# Patient Record
Sex: FEMALE | Race: WHITE | Employment: UNEMPLOYED | URBAN - NONMETROPOLITAN AREA
[De-identification: names, ages, dates, MRNs, and addresses within clinical notes are randomized per-mention and may not be internally consistent; named-entity substitution may affect disease eponyms.]

---

## 2022-06-13 ENCOUNTER — HOSPITAL ENCOUNTER (INPATIENT)
Age: 22
LOS: 2 days | Discharge: HOME | DRG: 885 | End: 2022-06-16
Attending: EMERGENCY MEDICINE | Admitting: PSYCHIATRY & NEUROLOGY
Payer: MEDICAID

## 2022-06-13 DIAGNOSIS — R45.851 DEPRESSION WITH SUICIDAL IDEATION: Primary | ICD-10-CM

## 2022-06-13 DIAGNOSIS — F32.A DEPRESSION WITH SUICIDAL IDEATION: Primary | ICD-10-CM

## 2022-06-13 LAB
ALBUMIN SERPL-MCNC: 4.4 G/DL (ref 3.5–5.2)
ALP BLD-CCNC: 61 U/L (ref 35–104)
ALT SERPL-CCNC: <5 U/L (ref 5–33)
AMPHETAMINE SCREEN, URINE: NEGATIVE
ANION GAP SERPL CALCULATED.3IONS-SCNC: 10 MMOL/L (ref 7–19)
AST SERPL-CCNC: 13 U/L (ref 5–32)
BARBITURATE SCREEN URINE: NEGATIVE
BASOPHILS ABSOLUTE: 0.1 K/UL (ref 0–0.2)
BASOPHILS RELATIVE PERCENT: 1 % (ref 0–1)
BENZODIAZEPINE SCREEN, URINE: NEGATIVE
BILIRUB SERPL-MCNC: 0.3 MG/DL (ref 0.2–1.2)
BUN BLDV-MCNC: 13 MG/DL (ref 6–20)
CALCIUM SERPL-MCNC: 9 MG/DL (ref 8.6–10)
CANNABINOID SCREEN URINE: POSITIVE
CHLORIDE BLD-SCNC: 103 MMOL/L (ref 98–111)
CO2: 26 MMOL/L (ref 22–29)
COCAINE METABOLITE SCREEN URINE: NEGATIVE
CREAT SERPL-MCNC: 0.8 MG/DL (ref 0.5–0.9)
EOSINOPHILS ABSOLUTE: 0 K/UL (ref 0–0.6)
EOSINOPHILS RELATIVE PERCENT: 0 % (ref 0–5)
ETHANOL: <10 MG/DL (ref 0–0.08)
GFR AFRICAN AMERICAN: >59
GFR NON-AFRICAN AMERICAN: >60
GLUCOSE BLD-MCNC: 96 MG/DL (ref 74–109)
HCG QUALITATIVE: NEGATIVE
HCT VFR BLD CALC: 38.6 % (ref 37–47)
HEMOGLOBIN: 12.9 G/DL (ref 12–16)
IMMATURE GRANULOCYTES #: 0 K/UL
LYMPHOCYTES ABSOLUTE: 6.3 K/UL (ref 1.1–4.5)
LYMPHOCYTES RELATIVE PERCENT: 57 % (ref 20–40)
Lab: ABNORMAL
MCH RBC QN AUTO: 28.7 PG (ref 27–31)
MCHC RBC AUTO-ENTMCNC: 33.4 G/DL (ref 33–37)
MCV RBC AUTO: 86 FL (ref 81–99)
MONOCYTES ABSOLUTE: 0.8 K/UL (ref 0–0.9)
MONOCYTES RELATIVE PERCENT: 7 % (ref 0–10)
NEUTROPHILS ABSOLUTE: 3.9 K/UL (ref 1.5–7.5)
NEUTROPHILS RELATIVE PERCENT: 35 % (ref 50–65)
OPIATE SCREEN URINE: NEGATIVE
PDW BLD-RTO: 13.2 % (ref 11.5–14.5)
PLATELET # BLD: 246 K/UL (ref 130–400)
PLATELET SLIDE REVIEW: ADEQUATE
PMV BLD AUTO: 11.3 FL (ref 9.4–12.3)
POTASSIUM SERPL-SCNC: 3.9 MMOL/L (ref 3.5–5)
RBC # BLD: 4.49 M/UL (ref 4.2–5.4)
RBC # BLD: NORMAL 10*6/UL
SARS-COV-2, NAAT: NOT DETECTED
SODIUM BLD-SCNC: 139 MMOL/L (ref 136–145)
TOTAL PROTEIN: 7.2 G/DL (ref 6.6–8.7)
WBC # BLD: 11.1 K/UL (ref 4.8–10.8)

## 2022-06-13 PROCEDURE — 82077 ASSAY SPEC XCP UR&BREATH IA: CPT

## 2022-06-13 PROCEDURE — 85025 COMPLETE CBC W/AUTO DIFF WBC: CPT

## 2022-06-13 PROCEDURE — 36415 COLL VENOUS BLD VENIPUNCTURE: CPT

## 2022-06-13 PROCEDURE — 80307 DRUG TEST PRSMV CHEM ANLYZR: CPT

## 2022-06-13 PROCEDURE — 80053 COMPREHEN METABOLIC PANEL: CPT

## 2022-06-13 PROCEDURE — 84703 CHORIONIC GONADOTROPIN ASSAY: CPT

## 2022-06-13 PROCEDURE — 87635 SARS-COV-2 COVID-19 AMP PRB: CPT

## 2022-06-13 ASSESSMENT — ENCOUNTER SYMPTOMS
EYE PAIN: 0
SHORTNESS OF BREATH: 0
DIARRHEA: 0
ABDOMINAL PAIN: 0
VOMITING: 0

## 2022-06-14 PROBLEM — F41.9 ANXIETY DISORDER, UNSPECIFIED: Status: ACTIVE | Noted: 2022-06-14

## 2022-06-14 PROBLEM — F41.8 MIXED ANXIETY AND DEPRESSIVE DISORDER: Status: ACTIVE | Noted: 2022-06-14

## 2022-06-14 PROBLEM — F33.1 MODERATE EPISODE OF RECURRENT MAJOR DEPRESSIVE DISORDER (HCC): Status: ACTIVE | Noted: 2022-06-14

## 2022-06-14 PROBLEM — F33.1 MODERATE EPISODE OF RECURRENT MAJOR DEPRESSIVE DISORDER (HCC): Status: RESOLVED | Noted: 2022-06-14 | Resolved: 2022-06-14

## 2022-06-14 PROCEDURE — 90792 PSYCH DIAG EVAL W/MED SRVCS: CPT | Performed by: NURSE PRACTITIONER

## 2022-06-14 PROCEDURE — 99285 EMERGENCY DEPT VISIT HI MDM: CPT

## 2022-06-14 PROCEDURE — 1240000000 HC EMOTIONAL WELLNESS R&B

## 2022-06-14 PROCEDURE — 6370000000 HC RX 637 (ALT 250 FOR IP): Performed by: PSYCHIATRY & NEUROLOGY

## 2022-06-14 PROCEDURE — 6370000000 HC RX 637 (ALT 250 FOR IP): Performed by: NURSE PRACTITIONER

## 2022-06-14 RX ORDER — TRAZODONE HYDROCHLORIDE 50 MG/1
25 TABLET ORAL NIGHTLY PRN
Status: DISCONTINUED | OUTPATIENT
Start: 2022-06-14 | End: 2022-06-16 | Stop reason: HOSPADM

## 2022-06-14 RX ORDER — ACETAMINOPHEN 325 MG/1
650 TABLET ORAL EVERY 4 HOURS PRN
Status: DISCONTINUED | OUTPATIENT
Start: 2022-06-14 | End: 2022-06-16 | Stop reason: HOSPADM

## 2022-06-14 RX ORDER — MECOBALAMIN 5000 MCG
5 TABLET,DISINTEGRATING ORAL NIGHTLY
Status: DISCONTINUED | OUTPATIENT
Start: 2022-06-14 | End: 2022-06-16 | Stop reason: HOSPADM

## 2022-06-14 RX ORDER — TRAZODONE HYDROCHLORIDE 50 MG/1
25 TABLET ORAL NIGHTLY
Status: DISCONTINUED | OUTPATIENT
Start: 2022-06-14 | End: 2022-06-14

## 2022-06-14 RX ORDER — POLYETHYLENE GLYCOL 3350 17 G/17G
17 POWDER, FOR SOLUTION ORAL DAILY PRN
Status: DISCONTINUED | OUTPATIENT
Start: 2022-06-14 | End: 2022-06-16 | Stop reason: HOSPADM

## 2022-06-14 RX ORDER — FLUOXETINE HYDROCHLORIDE 20 MG/1
20 CAPSULE ORAL DAILY
Status: DISCONTINUED | OUTPATIENT
Start: 2022-06-14 | End: 2022-06-16 | Stop reason: HOSPADM

## 2022-06-14 RX ORDER — HYDROXYZINE HYDROCHLORIDE 10 MG/1
10 TABLET, FILM COATED ORAL 3 TIMES DAILY PRN
Status: DISCONTINUED | OUTPATIENT
Start: 2022-06-14 | End: 2022-06-15

## 2022-06-14 RX ORDER — DOXEPIN HYDROCHLORIDE 25 MG/1
25 CAPSULE ORAL NIGHTLY
Status: DISCONTINUED | OUTPATIENT
Start: 2022-06-14 | End: 2022-06-14

## 2022-06-14 RX ADMIN — HYDROXYZINE HYDROCHLORIDE 10 MG: 10 TABLET ORAL at 22:10

## 2022-06-14 RX ADMIN — TRAZODONE HYDROCHLORIDE 12.5 MG: 50 TABLET ORAL at 22:07

## 2022-06-14 RX ADMIN — FLUOXETINE HYDROCHLORIDE 20 MG: 20 CAPSULE ORAL at 12:49

## 2022-06-14 RX ADMIN — Medication 5 MG: at 22:10

## 2022-06-14 RX ADMIN — Medication 5 MG: at 01:39

## 2022-06-14 RX ADMIN — HYDROXYZINE HYDROCHLORIDE 10 MG: 10 TABLET ORAL at 12:49

## 2022-06-14 RX ADMIN — DOXEPIN HYDROCHLORIDE 25 MG: 25 CAPSULE ORAL at 01:39

## 2022-06-14 SDOH — ECONOMIC STABILITY: HOUSING INSECURITY: IN THE LAST 12 MONTHS, HOW MANY PLACES HAVE YOU LIVED?: 1

## 2022-06-14 SDOH — ECONOMIC STABILITY: TRANSPORTATION INSECURITY
IN THE PAST 12 MONTHS, HAS THE LACK OF TRANSPORTATION KEPT YOU FROM MEDICAL APPOINTMENTS OR FROM GETTING MEDICATIONS?: NO

## 2022-06-14 SDOH — HEALTH STABILITY: PHYSICAL HEALTH: ON AVERAGE, HOW MANY DAYS PER WEEK DO YOU ENGAGE IN MODERATE TO STRENUOUS EXERCISE (LIKE A BRISK WALK)?: 0 DAYS

## 2022-06-14 SDOH — ECONOMIC STABILITY: FOOD INSECURITY: WITHIN THE PAST 12 MONTHS, THE FOOD YOU BOUGHT JUST DIDN'T LAST AND YOU DIDN'T HAVE MONEY TO GET MORE.: NEVER TRUE

## 2022-06-14 SDOH — ECONOMIC STABILITY: INCOME INSECURITY: IN THE LAST 12 MONTHS, WAS THERE A TIME WHEN YOU WERE NOT ABLE TO PAY THE MORTGAGE OR RENT ON TIME?: YES

## 2022-06-14 SDOH — ECONOMIC STABILITY: TRANSPORTATION INSECURITY
IN THE PAST 12 MONTHS, HAS LACK OF TRANSPORTATION KEPT YOU FROM MEETINGS, WORK, OR FROM GETTING THINGS NEEDED FOR DAILY LIVING?: NO

## 2022-06-14 SDOH — ECONOMIC STABILITY: HOUSING INSECURITY
IN THE LAST 12 MONTHS, WAS THERE A TIME WHEN YOU DID NOT HAVE A STEADY PLACE TO SLEEP OR SLEPT IN A SHELTER (INCLUDING NOW)?: NO

## 2022-06-14 SDOH — HEALTH STABILITY: PHYSICAL HEALTH: ON AVERAGE, HOW MANY MINUTES DO YOU ENGAGE IN EXERCISE AT THIS LEVEL?: 0 MIN

## 2022-06-14 SDOH — ECONOMIC STABILITY: FOOD INSECURITY: WITHIN THE PAST 12 MONTHS, YOU WORRIED THAT YOUR FOOD WOULD RUN OUT BEFORE YOU GOT MONEY TO BUY MORE.: NEVER TRUE

## 2022-06-14 ASSESSMENT — SOCIAL DETERMINANTS OF HEALTH (SDOH)
DO YOU BELONG TO ANY CLUBS OR ORGANIZATIONS SUCH AS CHURCH GROUPS UNIONS, FRATERNAL OR ATHLETIC GROUPS, OR SCHOOL GROUPS?: NO
WITHIN THE LAST YEAR, HAVE YOU BEEN HUMILIATED OR EMOTIONALLY ABUSED IN OTHER WAYS BY YOUR PARTNER OR EX-PARTNER?: NO
WITHIN THE LAST YEAR, HAVE TO BEEN RAPED OR FORCED TO HAVE ANY KIND OF SEXUAL ACTIVITY BY YOUR PARTNER OR EX-PARTNER?: NO
HOW OFTEN DO YOU ATTENT MEETINGS OF THE CLUB OR ORGANIZATION YOU BELONG TO?: NEVER
HOW OFTEN DO YOU GET TOGETHER WITH FRIENDS OR RELATIVES?: MORE THAN THREE TIMES A WEEK
WITHIN THE LAST YEAR, HAVE YOU BEEN AFRAID OF YOUR PARTNER OR EX-PARTNER?: NO
HOW OFTEN DO YOU ATTEND CHURCH OR RELIGIOUS SERVICES?: NEVER
ARE YOU MARRIED, WIDOWED, DIVORCED, SEPARATED, NEVER MARRIED, OR LIVING WITH A PARTNER?: NEVER MARRIED
HOW HARD IS IT FOR YOU TO PAY FOR THE VERY BASICS LIKE FOOD, HOUSING, MEDICAL CARE, AND HEATING?: NOT HARD AT ALL
IN A TYPICAL WEEK, HOW MANY TIMES DO YOU TALK ON THE PHONE WITH FAMILY, FRIENDS, OR NEIGHBORS?: MORE THAN THREE TIMES A WEEK
WITHIN THE LAST YEAR, HAVE YOU BEEN KICKED, HIT, SLAPPED, OR OTHERWISE PHYSICALLY HURT BY YOUR PARTNER OR EX-PARTNER?: NO

## 2022-06-14 ASSESSMENT — LIFESTYLE VARIABLES
HOW OFTEN DO YOU HAVE A DRINK CONTAINING ALCOHOL: NEVER
HOW OFTEN DO YOU HAVE A DRINK CONTAINING ALCOHOL: NEVER

## 2022-06-14 ASSESSMENT — PATIENT HEALTH QUESTIONNAIRE - PHQ9
DEPRESSION UNABLE TO ASSESS: YES
6. FEELING BAD ABOUT YOURSELF - OR THAT YOU ARE A FAILURE OR HAVE LET YOURSELF OR YOUR FAMILY DOWN: NEARLY EVERY DAY
2. FEELING DOWN, DEPRESSED OR HOPELESS: NEARLY EVERY DAY
1. LITTLE INTEREST OR PLEASURE IN DOING THINGS: NEARLY EVERY DAY
3. TROUBLE FALLING OR STAYING ASLEEP: NEARLY EVERY DAY
9. THOUGHTS THAT YOU WOULD BE BETTER OFF DEAD, OR OF HURTING YOURSELF: MORE THAN HALF THE DAYS
8. MOVING OR SPEAKING SO SLOWLY THAT OTHER PEOPLE COULD HAVE NOTICED. OR THE OPPOSITE, BEING SO FIGETY OR RESTLESS THAT YOU HAVE BEEN MOVING AROUND A LOT MORE THAN USUAL: NOT AT ALL
5. POOR APPETITE OR OVEREATING: MORE THAN HALF THE DAYS
SUM OF ALL RESPONSES TO PHQ9 QUESTIONS 1 & 2: 6
7. TROUBLE CONCENTRATING ON THINGS, SUCH AS READING THE NEWSPAPER OR WATCHING TELEVISION: NEARLY EVERY DAY
4. FEELING TIRED OR HAVING LITTLE ENERGY: NEARLY EVERY DAY
SUM OF ALL RESPONSES TO PHQ QUESTIONS 1-9: 22

## 2022-06-14 ASSESSMENT — SLEEP AND FATIGUE QUESTIONNAIRES
AVERAGE NUMBER OF SLEEP HOURS: 6
DO YOU HAVE DIFFICULTY SLEEPING: YES
SLEEP PATTERN: DIFFICULTY FALLING ASLEEP;DIFFICULTY ARISING;RESTLESSNESS
DO YOU USE A SLEEP AID: NO
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
## 2022-06-14 NOTE — PROGRESS NOTES
Admission Note      Reason for admission/Target Symptom: Patient admitted to [facility] due to:female who presents to the emergency department due to depression and suicidal ideation. Patient says she is out with depression for some time. Recently started following with [facility] [address] and is supposed to be on Celexa but has not been compliant with medication for the past month or so. No HI. No hallucinations or delusions. Does have SI without a specific plan. Denies any other medical problems. After being seen by her therapist today she was sent here as her therapist felt like she probably needs to be admitted. Patient is agreeable to admission. Diagnoses: Depression NOS  UDS: Cannabinoid   BAL:  Neg    SW met with treatment team to discuss patient's treatment including care planning, discharge planning, and follow-up needs. Pt has been admitted to [facility]. Treatment team has identified patient's discharge needs as medication management and outpatient therapy/counseling. Pt confirmed  the need for ongoing treatment post inpatient stay. Pt was also provided a handout of contact information for drug and alcohol treatment centers and other community support service such as [facility], AA, and [facility].

## 2022-06-14 NOTE — PLAN OF CARE
Problem: Self Harm/Suicidality  Goal: Will have no self-injury during hospital stay  6/14/2022 1628 by Juan Gonzalez  Outcome: Progressing       Group Therapy Note     Date: 6/14/2022  Start Time: 5891  End Time:  1600  Number of Participants: 9     Type of Group: Recovery     Wellness Binder Information  Module Name:  emotional wellness  Session Number:  1     Patient's Goal:  validation of feelings     Notes:  pt acknowledged to have feelings validated it may be necessary to share feelings with others.      Status After Intervention:  Improved     Participation Level: Interactive     Participation Quality: Appropriate, Attentive, and Sharing        Speech:  normal        Thought Process/Content: Logical        Affective Functioning: Congruent        Mood: congruent        Level of consciousness:  Alert, Oriented x4, and Attentive        Response to Learning: Able to verbalize current knowledge/experience        Endings: None Reported     Modes of Intervention: Education        Discipline Responsible: Psychoeducational Specialist        Signature:  Juan Gonzalez

## 2022-06-14 NOTE — H&P
Behavioral Services  Medicare Certification Upon Admission    I certify that this patient's inpatient psychiatric hospital admission is medically necessary for:    [x] (1) Treatment which could reasonably be expected to improve this patient's condition,       [] (2) Or for diagnostic study;     AND     [x](2) The inpatient psychiatric services are provided while the individual is under the care of a physician and are included in the individualized plan of care.     Estimated length of stay/service: 3 days-5 weeks    Plan for post-hospital care : tba    Electronically signed by KANDY Dodge on 6/14/2022 at 9:52 AM

## 2022-06-14 NOTE — PROGRESS NOTES
[facility] Adult Unit Daily Assessment  Nursing Progress Note    [place] [facility] [place] 2[place]606-01   Name: Winnie Forrest   Age: 25 y.o. Gender: female   Dx: Moderate episode of recurrent major depressive disorder (HCC)  Precautions: suicide risk  Inpatient Status: voluntary       SLEEP:    Sleep Quality Fair  Sleep Medications:   PRN Sleep Meds:        MEDICAL:    Other PRN Meds: No   Med Compliant: Yes  Accu-Chek: No  Oxygen/CPAP/BiPAP: No  CIWA/CINA: No   PAIN Assessment: none  Side Effects from medication: No    COVID Teaching:    Is Patient experiencing any respiratory symptoms (headache, fever, body aches, cough. Cherl Spinner ): no  Patient educated by nursing to practice social distancing, wear masks, wash hands frequently: yes    Medical Bed:   Is patient in a medical bed? no   If medical bed is in use, has nursing secured room while patient is awake and out of the room? NA  Has safety checks by nursing been completed on the bed/room this shift? yes    Protective Factors:    Patient identifies protective factors with nursing staff as follows: Identifies reasons for living: Yes   Supportive Social Network or family: Yes    Belief that suicide is immoral/high spirituality: Yes   Responsibility to family or others/living with family: Yes   Fear of death or dying due to pain and suffering: Yes   Engaged in work or school: Yes     If Patient is unable to identify, reason why? na      PSYCH:    Depression: 7   Anxiety: 10   SI denies suicidal ideation   HI Negative for homicidal ideation      AVH:Absent      GENERAL:    Appetite: good   Percent Meals: 100%   Social: Yes   Speech: normal   Appearance: appropriately dressed, appropriately groomed and healthy looking    GROUP:    Group Participation: Yes  Participation Quality: Active Listener    Notes: Patient is observed in day area talking on phone wearing casual attire. Well groomed. Cherl Spinner Has been pleasant, calm, and cooperative. Alert and oriented x 4. Social and friendly with staff and peers. Compliant with medications. Attending groups. Reports no issues with appetite and fair sleep. Decreased energy observed. Reports decreased motivation with loss of interest. Has been quiet and withdrawn this am. Mood and behavior is appropriate. Affect is congruent. Denies suicidal ideation, homicidal ideation, hallucinations, with no evidence of delusions or paranoia.      Electronically signed by Brittney Jimenez RN on 6/14/22 at 12:22 PM CDT

## 2022-06-14 NOTE — ED PROVIDER NOTES
[facility] ADULT [facility]  eMERGENCY dEPARTMENT eNCOUnter      Pt Name: Kingsley Vasquez  MRN: [id number]  [place] 2000  Date of evaluation: 6/13/2022  Provider: Lena Love MD    CHIEF COMPLAINT       Chief Complaint   Patient presents with    Mental Health Problem         HISTORY OF PRESENT ILLNESS   (Location/Symptom, Timing/Onset,Context/Setting, Quality, Duration, Modifying Factors, Severity)  Note limiting factors. Kingsley Vasquez is a 25 y.o. female who presents to the emergency department due to depression and suicidal ideation. Patient says she is out with depression for some time. Recently started following with [facility] [address] and is supposed to be on Celexa but has not been compliant with medication for the past month or so. No HI. No hallucinations or delusions. Does have SI without a specific plan. Denies any other medical problems. After being seen by her therapist today she was sent here as her therapist felt like she probably needs to be admitted. Patient is agreeable to admission. HPI    NursingNotes were reviewed. REVIEW OF SYSTEMS    (2-9 systems for level 4, 10 or more for level 5)     Review of Systems   Constitutional: Negative for fever. Eyes: Negative for pain. Respiratory: Negative for shortness of breath. Cardiovascular: Negative for chest pain and palpitations. Gastrointestinal: Negative for abdominal pain, diarrhea and vomiting. Genitourinary: Negative for dysuria. Skin: Negative for rash. Neurological: Negative for weakness and headaches. Psychiatric/Behavioral: Positive for self-injury (has cut in the past. hasn't cut recently) and suicidal ideas. Negative for hallucinations. All other systems reviewed and are negative. A complete review of systems was performed and is negative except as noted above in the HPI. PAST MEDICAL HISTORY   History reviewed. No pertinent past medical history. SURGICAL HISTORY     History reviewed.  No pertinent surgical history. CURRENT MEDICATIONS       There are no discharge medications for this patient. ALLERGIES     Patient has no known allergies. FAMILY HISTORY     History reviewed. No pertinent family history. SOCIAL HISTORY       Social History     Socioeconomic History    Marital status: Single     Spouse name: None    Number of children: None    Years of education: None    Highest education level: None   Occupational History    None   Tobacco Use    Smoking status: Never Smoker    Smokeless tobacco: Never Used   Substance and Sexual Activity    Alcohol use: Not Currently    Drug use: Yes     Types: Marijuana [place])    Sexual activity: None   Other Topics Concern    None   Social History Narrative    None     Social Determinants of Health     Financial Resource Strain: Low Risk     Difficulty of Paying Living Expenses: Not hard at all   Food Insecurity: No Food Insecurity    Worried About Running Out of Food in the Last Year: Never true    Shauna of Food in the Last Year: Never true   Transportation Needs: No Transportation Needs    Lack of Transportation (Medical): No    Lack of Transportation (Non-Medical):  No   Physical Activity: Inactive    Days of Exercise per Week: 0 days    Minutes of Exercise per Session: 0 min   Stress: Stress Concern Present    Feeling of Stress : Very much   Social Connections: Socially Isolated    Frequency of Communication with Friends and Family: More than three times a week    Frequency of Social Gatherings with Friends and Family: More than three times a week    Attends Adventism Services: Never    Active Member of Clubs or Organizations: No    Attends Club or Organization Meetings: Never    Marital Status: Never    Intimate Partner Violence: Not At Risk    Fear of Current or Ex-Partner: No    Emotionally Abused: No    Physically Abused: No    Sexually Abused: No   Housing Stability: High Risk    Unable to Pay for Housing in the Last Year: Yes    Number of Places Lived in the Last Year: 1    Unstable Housing in the Last Year: No       SCREENINGS    Pavithra Coma Scale  Eye Opening: Spontaneous  Best Verbal Response: Oriented  Best Motor Response: Obeys commands  Pavithra Coma Scale Score: 15        PHYSICAL EXAM    (up to 7 for level 4, 8 or more for level 5)     ED Triage Vitals   BP Temp Temp Source Heart Rate Resp SpO2 Height Weight   06/13/22 2210 06/13/22 2145 06/13/22 2145 06/13/22 2210 06/13/22 2145 06/13/22 2210 06/13/22 2145 06/13/22 2145   107/63 97.5 °F (36.4 °C) Oral 95 15 96 % 5' 4\" (1.626 m) 128 lb (58.1 kg)       Physical Exam  Vitals reviewed. Constitutional:       General: She is not in acute distress. Appearance: She is well-developed. HENT:      Head: Normocephalic and atraumatic. Eyes:      General: No scleral icterus. Pupils: Pupils are equal, round, and reactive to light. Neck:      Vascular: No JVD. Cardiovascular:      Rate and Rhythm: Normal rate and regular rhythm. Heart sounds: Normal heart sounds. Pulmonary:      Effort: Pulmonary effort is normal. No respiratory distress. Breath sounds: Normal breath sounds. Abdominal:      General: There is no distension. Palpations: Abdomen is soft. Tenderness: There is no abdominal tenderness. Musculoskeletal:         General: No tenderness. Cervical back: Normal range of motion and neck supple. Skin:     General: Skin is warm and dry. Capillary Refill: Capillary refill takes less than 2 seconds. Neurological:      Mental Status: She is alert and oriented to person, place, and time. Psychiatric:         Mood and Affect: Mood is depressed. Speech: Speech normal.         Behavior: Behavior normal.         Thought Content: Thought content does not include suicidal ideation.          DIAGNOSTIC RESULTS     EKG: All EKG's are interpreted by the Emergency Department Physician who either signs or Co-signs this chart in the absence of a cardiologist.        RADIOLOGY:   Non-plain film images such as CT, Ultrasound and MRI are read by the radiologist. Plainradiographic images are visualized and preliminarily interpreted by the emergency physician with the below findings:        Interpretation per the Radiologist below, if available at the time of this note:    No orders to display         ED BEDSIDE ULTRASOUND:   Performed by ED Physician - none    LABS:  Labs Reviewed   CBC WITH AUTO DIFFERENTIAL - Abnormal; Notable for the following components:       Result Value    WBC 11.1 (*)     Neutrophils % 35.0 (*)     Lymphocytes % 57.0 (*)     Lymphocytes Absolute 6.3 (*)     All other components within normal limits   COMPREHENSIVE METABOLIC PANEL - Abnormal; Notable for the following components:    ALT <5 (*)     All other components within normal limits   URINE DRUG SCREEN - Abnormal; Notable for the following components:    Cannabinoid Scrn, Ur Positive (*)     All other components within normal limits   COVID-19, RAPID   ETHANOL   HCG, SERUM, QUALITATIVE       All other labs were within normal range or not returned as of this dictation. EMERGENCY DEPARTMENT COURSE and DIFFERENTIALDIAGNOSIS/MDM:   Vitals:    Vitals:    06/13/22 2145 06/13/22 2210 06/14/22 0013   BP:  107/63 110/71   Pulse:  85 72   Resp: 15 18 16   Temp: 97.5 °F (36.4 °C) 98 °F (36.7 °C) 98.2 °F (36.8 °C)   TempSrc: Oral Oral Temporal   SpO2:  96% 99%   Weight: 128 lb (58.1 kg)  127 lb (57.6 kg)   Height: 5' 4\" (1.626 m)         MDM  Patient's case discussed with Dr. Duane Robson who will admit. Patient resting comfortably at this time. CONSULTS:  IP CONSULT TO SOCIAL WORK  IP CONSULT TO INTERNAL MEDICINE    PROCEDURES:  Unless otherwise notedbelow, none     Procedures    FINAL IMPRESSION     1.  Depression with suicidal ideation          DISPOSITION/PLAN   DISPOSITION Decision To Admit 06/13/2022 11:53:18 PM      PATIENT REFERRED TO:  @FUP@    DISCHARGE MEDICATIONS:  There are no discharge medications for this patient.          (Please note that portions of this note were completed with a voice recognition program.  Efforts were made to edit the dictations butoccasionally words are mis-transcribed.)    Eugene Solis MD (electronically signed)  AttendingEmergency Physician         Eugene Solis MD  06/14/22 5269

## 2022-06-14 NOTE — PROGRESS NOTES
Treatment Team Note:    Therapist met with treatment team to discuss patients treatment and discharge plans. Progress/Behavior/Group Attendance: TBD    Target Symptoms/Reason for admission: Patient admitted to [facility] due to:female who presents to the emergency department due to depression and suicidal ideation. Candace Patten says she is out with depression for some time.  Recently started following with [facility] [address] and is supposed to be on Celexa but has not been compliant with medication for the past month or so.  No HI.  No hallucinations or delusions.  Does have SI without a specific plan.  Denies any other medical problems.  After being seen by her therapist today she was sent here as her therapist felt like she probably needs to be admitted. Candace Patten is agreeable to admission. Diagnoses: Depression NOS  UDS: Cannabinoid   BAL:  Neg      Aftercare Plan: [address] lives with: SYLVIE will meet with patient to gather information. Collateral obtained from: SW will meet with patient to gather release of information.   On:    Family Session: TBA    Depression:   Anxiety:    Taking medication:  Rate the quality of sleep:    Misc:

## 2022-06-14 NOTE — PROGRESS NOTES
Explained to patient she wouldn't be going home today and talked to patient 1 to 1 in room. Patient verbalizes understanding and apologetic.

## 2022-06-14 NOTE — ED NOTES
PT c/o depression and anxiety, denies SI/HI. PT states she sees a counselor at [facility] who suggested she come here for evaluation.       Tanja Bowers RN  06/13/22 4029
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
## 2022-06-14 NOTE — PLAN OF CARE
Problem: Self Harm/Suicidality  Goal: Will have no self-injury during hospital stay  Outcome: Progressing     Problem: Safety - Adult  Goal: Free from fall injury  Outcome: Progressing

## 2022-06-14 NOTE — PROGRESS NOTES
SW met with patient to complete Psychosocial and Lifetime CSSR-S on this date. Patients long and short-term goals discussed. Patient voiced understanding. Treatment plan sheet signed. Patient verbalized understanding of the treatment plan. Patient participated in goals and objectives of the treatment plan. Patient discussed safety plan with . SW explained treatment goals with pt. Decreasing depression and anxiety by improvement of positive coping patterns was discussed. Pt acknowledged understanding of treatment goals and signed treatment plan signature sheet. In the last 6 months has the patient been a danger to self: YES  In the last 6 months has the patient been a danger to others: NO  Legal Guardian/POA: NO     Provided patient with [employer] handout entitled \"Quitting Smoking. \"  Reviewed handout with patient: addressing dangers of smoking, developing coping skills, and providing basic information about quitting. Patient received all components practical counseling of tobacco practical counseling during the hospital stay.

## 2022-06-14 NOTE — PLAN OF CARE
Problem: Self Harm/Suicidality  Goal: Will have no self-injury during hospital stay  6/14/2022 1150 by Memo Bridges  Outcome: Progressing      Group Therapy Note     Date: 6/14/2022  Start Time: 1100  End Time:  3542  Number of Participants: 11     Type of Group: Psychoeducation     Wellness Binder Information  Module Name:  daily maintenance coping skills  Session Number:  1     Patient's Goal:  daily maintenance coping skills     Notes:  pt acknowledged use of positive coping skills daily to help stay well.      Status After Intervention:  Improved     Participation Level: Interactive     Participation Quality: Appropriate, Attentive, and Sharing        Speech:  normal        Thought Process/Content: Logical        Affective Functioning: Congruent        Mood: congruent           Level of consciousness:  Alert, Oriented x4, and Attentive        Response to Learning: Able to verbalize current knowledge/experience        Endings: None Reported     Modes of Intervention: Education        Discipline Responsible: Psychoeducational Specialist        Signature:  Memo Bridges

## 2022-06-15 LAB
TSH REFLEX FT4: 1.68 UIU/ML (ref 0.35–5.5)
VITAMIN B-12: 417 PG/ML (ref 211–946)
VITAMIN D 25-HYDROXY: 20.9 NG/ML

## 2022-06-15 PROCEDURE — 82607 VITAMIN B-12: CPT

## 2022-06-15 PROCEDURE — 82306 VITAMIN D 25 HYDROXY: CPT

## 2022-06-15 PROCEDURE — 6370000000 HC RX 637 (ALT 250 FOR IP): Performed by: NURSE PRACTITIONER

## 2022-06-15 PROCEDURE — 6370000000 HC RX 637 (ALT 250 FOR IP): Performed by: PSYCHIATRY & NEUROLOGY

## 2022-06-15 PROCEDURE — 1240000000 HC EMOTIONAL WELLNESS R&B

## 2022-06-15 PROCEDURE — 36415 COLL VENOUS BLD VENIPUNCTURE: CPT

## 2022-06-15 PROCEDURE — 84443 ASSAY THYROID STIM HORMONE: CPT

## 2022-06-15 PROCEDURE — 99232 SBSQ HOSP IP/OBS MODERATE 35: CPT | Performed by: NURSE PRACTITIONER

## 2022-06-15 RX ORDER — ERGOCALCIFEROL 1.25 MG/1
50000 CAPSULE ORAL WEEKLY
Status: DISCONTINUED | OUTPATIENT
Start: 2022-06-15 | End: 2022-06-16 | Stop reason: HOSPADM

## 2022-06-15 RX ORDER — HYDROXYZINE HYDROCHLORIDE 25 MG/1
25 TABLET, FILM COATED ORAL 3 TIMES DAILY PRN
Status: DISCONTINUED | OUTPATIENT
Start: 2022-06-15 | End: 2022-06-16 | Stop reason: HOSPADM

## 2022-06-15 RX ADMIN — Medication 5 MG: at 21:39

## 2022-06-15 RX ADMIN — FLUOXETINE HYDROCHLORIDE 20 MG: 20 CAPSULE ORAL at 08:09

## 2022-06-15 RX ADMIN — HYDROXYZINE HYDROCHLORIDE 25 MG: 25 TABLET, FILM COATED ORAL at 21:39

## 2022-06-15 RX ADMIN — HYDROXYZINE HYDROCHLORIDE 25 MG: 25 TABLET, FILM COATED ORAL at 13:32

## 2022-06-15 RX ADMIN — TRAZODONE HYDROCHLORIDE 12.5 MG: 50 TABLET ORAL at 21:39

## 2022-06-15 NOTE — PROGRESS NOTES
[facility] Adult Unit Daily Assessment  Nursing Progress Note     Room: [facility]/606-01   Name: Jayden Tomlinson   Age: 25 y.o. Gender: female   Dx: Moderate episode of recurrent major depressive disorder (HCC)  Precautions: suicide risk  Inpatient Status: voluntary         SLEEP:     Sleep Quality good  Sleep Medications:   PRN Sleep Meds:          MEDICAL:     Other PRN Meds: No   Med Compliant: Yes  Accu-Chek: No  Oxygen/CPAP/BiPAP: No  CIWA/CINA: No   PAIN Assessment: none  Side Effects from medication: No     COVID Teaching:     Is Patient experiencing any respiratory symptoms (headache, fever, body aches, cough. Xin Tan ): no  Patient educated by nursing to practice social distancing, wear masks, wash hands frequently: yes     Medical Bed:   Is patient in a medical bed? no   If medical bed is in use, has nursing secured room while patient is awake and out of the room? NA  Has safety checks by nursing been completed on the bed/room this shift? yes     Protective Factors:     Patient identifies protective factors with nursing staff as follows: Identifies reasons for living: Yes              Supportive Social Network or family: Yes               Belief that suicide is immoral/high spirituality: Yes              Responsibility to family or others/living with family: Yes              Fear of death or dying due to pain and suffering: Yes              Engaged in work or school: Yes                If Patient is unable to identify, reason why? na        PSYCH:     Depression:  0  Anxiety:  6  SI denies suicidal ideation   HI Negative for homicidal ideation      AVH:Absent        GENERAL:     Appetite: good   Percent Meals: 100%   Social: Yes   Speech: normal   Appearance: appropriately dressed, appropriately groomed and healthy looking     GROUP:     Group Participation: Yes  Participation Quality: Active Listener     Notes: Patient is observed in day area reading a book, wearing casual attire. Well groomed.  . Hellen Raman appropriately during interview with good eye contact and attentiveness. Has been pleasant, calm, and cooperative. Alert and oriented x 4. Social and friendly with staff and peers. Compliant with medications. Attending groups. Reports no issues with appetite or sleep. Affect is bright. Mood and behavior is appropriate. Reports no depression today and anxiety has improved from yesterday. Affect is congruent. Denies suicidal ideation, homicidal ideation, hallucinations, with no evidence of delusions or paranoia.      Electronically signed by Rosa Isela Naidu RN on 6/15/22 at 12:49 PM CDT

## 2022-06-15 NOTE — PLAN OF CARE
Problem: Self Harm/Suicidality  Goal: Will have no self-injury during hospital stay  6/15/2022 1612 by Ophelia Pardo  Outcome: Progressing      Group Therapy Note     Date: 6/15/2022  Start Time: 2086  End Time:  1600  Number of Participants: 9     Type of Group: Recovery     Wellness Binder Information  Module Name:  relapse prevention  Session Number:  2     Patient's Goal:  identifying early warning signs     Notes:  pt acknowledged negative thinking as an early warning sign to help prevent relapse.      Status After Intervention:  Improved     Participation Level: Interactive     Participation Quality: Appropriate, Attentive, and Sharing        Speech:  normal        Thought Process/Content: Logical        Affective Functioning: Congruent        Mood: congruent        Level of consciousness:  Alert, Oriented x4, and Attentive        Response to Learning: Able to verbalize current knowledge/experience        Endings: None Reported     Modes of Intervention: Education        Discipline Responsible: Psychoeducational Specialist        Signature:  Ophelia Pardo

## 2022-06-15 NOTE — PROGRESS NOTES
Group Therapy Note    Start Time: 800  End Time:  788  Number of Participants: 13    Type of Group: Community Meeting       Patient's Goal:  \"Controlling my anxiety & fears\"      Notes:        Participation Level:  Active Listener       Participation Quality: Appropriate      Thought Process/Content: Logical      Affective Functioning: Congruent      Mood: Calm      Level of consciousness:  Alert      Modes of Intervention: Support      Discipline Responsible: Behavioral Health Tech II      Signature:  Joshua Bhatia

## 2022-06-15 NOTE — PLAN OF CARE
Problem: Self Harm/Suicidality  Goal: Will have no self-injury during hospital stay  6/15/2022 1137 by Johnnie Ball  Outcome: Progressing      Group Therapy Note     Date: 6/15/2022  Start Time: 1100  End Time:  8713  Number of Participants: 11     Type of Group: Psychoeducation     Wellness Binder Information  Module Name:  Emotional wellness  Session Number:  5     Patient's Goal:  obstacles to emotional wellness     Notes:  pt acknowledged negative thinking as an obstacle to emotional wellness     Status After Intervention:  Improved     Participation Level: Interactive     Participation Quality: Appropriate, Attentive, and Sharing        Speech:  normal        Thought Process/Content: Logical        Affective Functioning: Congruent        Mood: congruent        Level of consciousness:  Alert, Oriented x4, and Attentive        Response to Learning: Able to verbalize current knowledge/experience        Endings: None Reported     Modes of Intervention: Education        Discipline Responsible: Psychoeducational Specialist        Signature:  Johnnie Ball

## 2022-06-15 NOTE — PROGRESS NOTES
History:   Social History     Substance and Sexual Activity   Alcohol Use Not Currently         Social History     Substance and Sexual Activity   Drug Use Yes    Types: Marijuana (Weed)        Social History     Tobacco Use   Smoking Status Never Smoker   Smokeless Tobacco Never Used        Family History:     History reviewed. No pertinent family history. Mental Status:  Level of consciousness:  within normal limits and awake  Appearance:  well-appearing, street clothes, in chair, good grooming and good hygiene  Behavior/Motor:  no abnormalities noted  Attitude toward examiner:  cooperative, attentive and good eye contact  Speech:  normal rate and normal volume  Mood:  \" I am actually feeling better today. \"  Affect:  mood congruent  Thought processes:  linear and goal directed  Thought content:  Homocidal ideation :denies  Suicidal Ideation:  denies suicidal ideation  Delusions:  no evidence of delusions  Perceptual Disturbance:  denies any perceptual disturbance  Cognition:  oriented to person, place, and time  Concentration : good- sitting in her bed reading a book  Memory intact for recent and remote  Fund of knowledge:  average  Abstract thinking:  adequate  Insight: improved-future oriented  Judgment:  Appropriate      vitamin D  50,000 Units Oral Weekly    melatonin  5 mg Oral Nightly    FLUoxetine  20 mg Oral Daily       Current Medications:  Current Facility-Administered Medications   Medication Dose Route Frequency Provider Last Rate Last Admin    vitamin D (ERGOCALCIFEROL) capsule 50,000 Units  50,000 Units Oral Weekly Nathaniel Ayala MD        hydrOXYzine HCl (ATARAX) tablet 25 mg  25 mg Oral TID PRN KANDY Meyer        acetaminophen (TYLENOL) tablet 650 mg  650 mg Oral Q4H PRN Khanh Vargas MD        polyethylene glycol (GLYCOLAX) packet 17 g  17 g Oral Daily PRN Khanh Vargas MD        melatonin disintegrating tablet 5 mg  5 mg Oral Nightly Khanh Vargas MD   5 mg at 06/14/22 2210    FLUoxetine (PROZAC) capsule 20 mg  20 mg Oral Daily KANDY Holm   20 mg at 06/15/22 0809    traZODone (DESYREL) tablet 25 mg  25 mg Oral Nightly PRN KANDY Holm   12.5 mg at 06/14/22 2207       Psychotherapy:   SUPPORTIVE    DSM V Diagnoses:    Principal Problem: Moderate episode of recurrent major depressive disorder (HCC)  Active Problems:    Anxiety disorder, unspecified  Resolved Problems:    * No resolved hospital problems. *            Plan:    Encourage group therapy  15 minute safety checks  Medical monitoring by Dr. Ophelia Schaffer and associates  Continue current therapy and medications  Will increase Hydroxyzine to 25 mg po TID prn for anxiety     Amount of time spent with patient:  15 minutes with greater than 50% of the time spent in counseling and collaboration of care.     KANDY Holm  Clinician Signature: signed electronically

## 2022-06-15 NOTE — PSYCHOTHERAPY
Group Therapy Note    Date: 6/15/2022  Start Time: 1330  End Time:  1400  Number of Participants: 10    Type of Group: SPIRITUALITY    Wellness Binder Information  Module Name:  Mindfulness  Session Number:    Patient's Goal:  To rest the mind on the moment using the senses. Notes:      Status After Intervention:  Improved    Participation Level:  Active Listener and Interactive    Participation Quality: Appropriate, Attentive and Sharing      Speech:  normal      Thought Process/Content:       Affective Functioning: Congruent      Mood: calm      Level of consciousness:  Oriented x4      Response to Learning: Able to verbalize current knowledge/experience and Capable of insight      Endings:     Modes of Intervention: Education and Clarifying      Discipline Responsible:       Signature:  Bravo Francois  [facility]

## 2022-06-15 NOTE — PLAN OF CARE
Problem: Self Harm/Suicidality  Goal: Will have no self-injury during hospital stay  Outcome: Progressing     Problem: Safety - Adult  Goal: Free from fall injury  Outcome: Progressing  Flowsheets (Taken 6/14/2022 2101 by Ayo Lake RN)  Free From Fall Injury: Instruct family/caregiver on patient safety

## 2022-06-15 NOTE — PROGRESS NOTES
[facility] Adult Unit Daily Assessment  Nursing Progress Note    Room: [facility]/606-01   Name: Kimberley Flores   Age: 25 y.o. Gender: female   Dx: Moderate episode of recurrent major depressive disorder (HCC)  Precautions: close watch and suicide risk  Inpatient Status: voluntary       SLEEP:    Sleep Quality Good  Sleep Medications: Yes   PRN Sleep Meds: Yes       MEDICAL:    Other PRN Meds: Yes   Med Compliant: Yes  Accu-Chek: No  Oxygen/CPAP/BiPAP: No  CIWA/CINA: No   PAIN Assessment: none  Side Effects from medication: No    COVID Teaching:    Is Patient experiencing any respiratory symptoms (headache, fever, body aches, cough. Jay Magic ): no  Patient educated by nursing to practice social distancing, wear masks, wash hands frequently: yes    Medical Bed:   Is patient in a medical bed? no   If medical bed is in use, has nursing secured room while patient is awake and out of the room? NA  Has safety checks by nursing been completed on the bed/room this shift? NA    Protective Factors:    Patient identifies protective factors with nursing staff as follows: Identifies reasons for living: Yes   Supportive Social Network or family: Yes    Belief that suicide is immoral/high spirituality: Yes   Responsibility to family or others/living with family: Yes   Fear of death or dying due to pain and suffering: Yes   Engaged in work or school: Yes     If Patient is unable to identify, reason why? N/A      PSYCH:    Depression: 0   Anxiety: 5   SI denies suicidal ideation   HI Negative for homicidal ideation      AVH:Absent      GENERAL:    Appetite: good   Percent Meals: did not consume a meal this shift   Social: Yes   Speech: normal   Appearance: appropriately dressed, appropriately groomed and healthy looking    GROUP:    Group Participation: Yes  Participation Quality: Active Listener    Notes:   PT states that trazodone makes her sick, nausea, Medication split again for 12.5 mg PT reports she takes 25 mg at home but breaks that in half. PT was in the day area at this encounter reading a book. PT is bright and rates anxiety 5, just from being here but improved from earlier in the day.    Electronically signed by Alistair Leahy RN on 6/15/22 at 2:27 AM CDT

## 2022-06-15 NOTE — PROGRESS NOTES
Collateral obtained from:patients pricila Acosta [phone]    Immediate Stressors & Time Episode Began:Patient said that her therapist suggested that she came here. Patient goes to [employer]. Patient lives with her mom and grandmother and patient also has a lot of animals and recently had to give her dog away because she had too many pets and those type of things bother her. Patient recently quit her job because of the stress of the job. Patient father passed away when she was 10 years since then and she is having a hard time being away from her mother    Diagnosis/Hx of compliance with meds:Patient was taking medication but stopped taking it. Tx Hx/Past hospitalizations:No previous admissions    Family hx of psychiatric issues:No issues    Substance Abuse:No issues    Pending Legal:No issues    Safety Issues (Weapons? Hx of attempts): The importance of locking weapons in a secured location was explained and recommended to collateral. No issues    Support system/Medication Managed by:  The importance of medication management and locking extra medication in a secured location was explained and recommended to collateral.    Who does the pt live with:Patient lives with her mom and uncle and grandmother     Additional Info:

## 2022-06-15 NOTE — PLAN OF CARE
Problem: Self Harm/Suicidality  Goal: Will have no self-injury during hospital stay  Description: INTERVENTIONS:  1. Q 30 MINUTES: Routine safety checks  2. Q SHIFT & PRN: Assess risk to determine if routine checks are adequate to maintain patient safety  6/15/2022 1133 by Sindy Crabtree LPC  Outcome: Progressing     Group Therapy Note     Date: 6/15/2022  Start Time: 1000  End Time:  5865  Number of Participants: 10     Type of Group: Psychotherapy     Patient's Goal: Patient will process emotions and actions and how to relate to other or their with others. Patient discussed open communication and feelings and emotions. Notes:  Patient attended process group as scheduled, patient identified a issue to work on today regarding how they will interact and relate to others. Status After Intervention:  Improved     Participation Level:  Active Listener     Participation Quality: Appropriate, Attentive, and Sharing        Speech:  normal        Thought Process/Content: Logical        Affective Functioning: Congruent        Mood: euthymic        Level of consciousness:  Alert        Response to Learning: Able to verbalize current knowledge/experience        Endings: None Reported     Modes of Intervention: Education, Support, and Socialization        Discipline Responsible: /Counselor        Signature:  Sindy Crabtree [facility]

## 2022-06-15 NOTE — PROGRESS NOTES
Treatment Team Note:     Therapist met with treatment team to discuss patients treatment and discharge plans.      Progress/Behavior/Group Attendance: TBD     Target Symptoms/Reason for admission: Patient admitted to [facility] due to:female who presents to the emergency department due to depression and suicidal ideation. Lou Mcnamara says she is out with depression for some time.  Recently started following with [facility] [address] and is supposed to be on Celexa but has not been compliant with medication for the past month or so.  No HI.  No hallucinations or delusions.  Does have SI without a specific plan.  Denies any other medical problems. Carmen Knowles being seen by her therapist today she was sent here as her therapist felt like she probably needs to be admitted. Lou Mcnamara is agreeable to admission. Diagnoses: Moderate episode of recurrent major depressive disorder   Anxiety Unspecified  Chronic PTSD   UDS: Cannabinoid   BAL:  Neg      Aftercare Plan: [address]     Pt lives with: family     Collateral obtained from: mom  On:6/15/22     Family Session: TBA     Depression: 0   Anxiety: 6  SI denies suicidal ideation   HI Negative for homicidal ideation      AVH:Absent     Taking medication:  Rate the quality of sleep:Sleep Quality Good  Sleep Medications: Yes   PRN Sleep Meds:  Yes      Misc:

## 2022-06-15 NOTE — PROGRESS NOTES
Group Note    Number of Participants in Group: 12  Number of Patients on Unit:10      Patient attended group:Yes  Reason for Absence:  Intervention for patient absence:        Type of Group:   Wrap-Up/Relaxation    Patient's Goal: See wrap up group sheet    Participation Level:    Interactive           Patient Response to Learning: Yes    Patient's Behavior: Pleasant    Is Patient Social/Interacting: Yes    Relaxation:   Television:No   Reading:Yes   Game/Puzzle:No   Phone: Yes       Notes/Comments:      Please see patient's wrap up group sheet for patient's comments       Electronically signed by Karyn Stovall on 6/15/22 at 2:08 AM CDT

## 2022-06-15 NOTE — H&P
HISTORY and PHYSICAL      CHIEF COMPLAINT:  Depression, SI    Reason for Admission:  Depression, SI    History Obtained From:  Patient, chart    HISTORY OF PRESENT ILLNESS:      The patient is a 25 y.o. female who is admitted to the [place] [address] unit with worsening mood issues. She has no c/o CP or SOA. No abdominal pain or N/V. No dysuria. No new pain issues. No HA or dizziness. No fevers. Past Medical History:    History reviewed. No pertinent past medical history. Past Surgical History:    History reviewed. No pertinent surgical history. Medications Prior to Admission:    No medications prior to admission. Allergies:  Patient has no known allergies. Social History:   TOBACCO:   reports that she has never smoked. She has never used smokeless tobacco.  ETOH:   reports previous alcohol use. DRUGS:   reports current drug use. Drug: Marijuana Garon Kettle). MARITAL STATUS:  single  OCCUPATION:  She is working  Patient currently lives with family       Family History:   History reviewed. No pertinent family history. REVIEW OF SYSTEMS:  Constitutional: neg  CV: neg  Pulmonary: neg  GI: neg  : neg  Psych: depression, SI  Neuro: neg  Skin: neg  MusculoSkeletal: neg  HEENT: neg  Joints: neg    Vitals:  /73   Pulse 91   Temp 98.4 °F (36.9 °C) (Temporal)   Resp 16   Ht 5' 4\" (1.626 m)   Wt 127 lb (57.6 kg)   SpO2 100%   BMI 21.80 kg/m²     PHYSICAL EXAM:  Gen: NAD, alert  HEENT: WNL  Lymph: no LAD  Neck: no JVD or masses  Chest: CTA bilat  CV: RRR  Abdomen: NT/ND  Extrem: no C/C/E  Neuro: non focal  Skin: no rashes  Joints: no redness    DATA:  I have reviewed the admission labs and imaging tests. ASSESSMENT AND PLAN:      Principal Problem:     Moderate episode of recurrent major depressive disorder, SI---follow with Psych    THC use      Florina Wells MD  10:42 PM 6/14/2022

## 2022-06-16 VITALS
DIASTOLIC BLOOD PRESSURE: 60 MMHG | BODY MASS INDEX: 21.68 KG/M2 | WEIGHT: 127 LBS | HEART RATE: 90 BPM | HEIGHT: 64 IN | SYSTOLIC BLOOD PRESSURE: 113 MMHG | TEMPERATURE: 97.7 F | RESPIRATION RATE: 16 BRPM | OXYGEN SATURATION: 98 %

## 2022-06-16 PROCEDURE — 99238 HOSP IP/OBS DSCHRG MGMT 30/<: CPT | Performed by: NURSE PRACTITIONER

## 2022-06-16 PROCEDURE — 6370000000 HC RX 637 (ALT 250 FOR IP): Performed by: NURSE PRACTITIONER

## 2022-06-16 RX ORDER — FLUOXETINE HYDROCHLORIDE 20 MG/1
20 CAPSULE ORAL DAILY
Qty: 30 CAPSULE | Refills: 0 | Status: SHIPPED | OUTPATIENT
Start: 2022-06-17

## 2022-06-16 RX ORDER — HYDROXYZINE HYDROCHLORIDE 25 MG/1
25 TABLET, FILM COATED ORAL 3 TIMES DAILY PRN
Qty: 30 TABLET | Refills: 0 | Status: SHIPPED | OUTPATIENT
Start: 2022-06-16 | End: 2022-06-26

## 2022-06-16 RX ORDER — ERGOCALCIFEROL 1.25 MG/1
50000 CAPSULE ORAL WEEKLY
Qty: 5 CAPSULE | Refills: 0 | Status: SHIPPED | OUTPATIENT
Start: 2022-06-16 | End: 2022-09-02

## 2022-06-16 RX ADMIN — FLUOXETINE HYDROCHLORIDE 20 MG: 20 CAPSULE ORAL at 07:41

## 2022-06-16 NOTE — PLAN OF CARE
Problem: Self Harm/Suicidality  Goal: Will have no self-injury during hospital stay  Description: INTERVENTIONS:  1. Q 30 MINUTES: Routine safety checks  2. Q SHIFT & PRN: Assess risk to determine if routine checks are adequate to maintain patient safety  Outcome: Progressing     Group Therapy Note     Date: 6/16/2022  Start Time: 1000  End Time:  7984  Number of Participants: 9     Type of Group: Psychotherapy     Patient's Goal: Patient will process emotions and actions and how to relate to other or their with others. Patient discussed open communication and feelings and emotions. Notes:  Patient attended process group as scheduled, patient identified a issue to work on today regarding how they will interact and relate to others. Status After Intervention:  Improved     Participation Level:  Active Listener     Participation Quality: Appropriate, Attentive, and Sharing        Speech:  normal        Thought Process/Content: Logical        Affective Functioning: Congruent        Mood: euthymic        Level of consciousness:  Alert        Response to Learning: Able to verbalize current knowledge/experience        Endings: None Reported     Modes of Intervention: Education, Support, and Socialization        Discipline Responsible: /Counselor        Signature:  Lauryn Spencer [facility]

## 2022-06-16 NOTE — PROGRESS NOTES
Behavioral Health   Discharge Note  Bridge Appointment completed: Reviewed Discharge Instructions with patient. Patient verbalizes understanding and agreement with the discharge plan using the teachback method. Referral for Outpatient Tobacco Cessation Counseling, upon discharge (renata X if applicable and completed):    ( )  Hospital staff assisted patient to call Quit Line or faxed referral                                   during hospitalization                  ( )  Recognizing danger situations (included triggers and roadblocks), if not completed on admission                    ( )  Coping skills (new ways to manage stress, exercise, relaxation techniques, changing routine, distraction), if not completed on admission                                                           ( )  Basic information about quitting (benefits of quitting, techniques in how to quit, available resources, if not completed on admission  ( ) Referral for counseling faxed to [facility]   ( ) Patient refused referral  ( ) Patient refused counseling  (x) Patient refused smoking cessation medication upon discharge    Vaccinations (renata X if applicable and completed):  ( ) Patient states already received influenza vaccine elsewhere  ( ) Patient received influenza vaccine during this hospitalization  (x ) Patient refused influenza vaccine at this time      Pt discharged with followings belongings:   Dental Appliances: None  Vision - Corrective Lenses: Contact Lenses  Hearing Aid: None  Jewelry: Earrings  Body Piercings Removed: Yes  Clothing: Footwear,Shirt,Pants,Undergarments  Other Valuables: Money,Other (Comment) (in safe on unit)   Valuables retrieved from safe and returned to patient. Patient left department with family via private vehicle, discharged to home. Patient education on aftercare instructions: Yes  Patient verbalize understanding of AVS:  yes. Suicidal Ideations? No  AVH? None HI?  Negative for homicidal ideation       Status EXAM upon discharge:  Mental Status and Behavioral Exam  Normal: Yes  Level of Assistance: Independent/Self  Facial Expression: Brightened  Affect: Appropriate,Congruent  Level of Consciousness: Alert  Frequency of Checks: 4 times per hour, close  Mood:Normal: No  Mood: Anxious (Anxiety: 5)  Motor Activity:Normal: Yes  Motor Activity: Other (comment) (appropriate for pt and situation)  Eye Contact: Good  Observed Behavior: Friendly,Cooperative  Sexual Misconduct History: Current - no  Preception: [place] to person,[place] to time,[place] to place,[place] to situation  Attention:Normal: Yes  Attention: Others (comment) (appropriate for pt and sitation)  Thought Processes: Other (comment) (linear and goal oriented)  Thought Content:Normal: Yes  Thought Content: Other (comment) (appropriate for pt and and situation)  Depression Symptoms: No problems reported or observed. Anxiety Symptoms: Social phobias,Unexplained fears,Feelings of doom  Sara Symptoms: No problems reported or observed. Hallucinations: None  Delusions: No  Memory:Normal: Yes  Memory: Other (comment) (appropriate for pt and situation)  Insight and Judgment: Yes  Insight and Judgment: Other (comment) (motivated)    AVS/Transition Record has been discussed with patient and a copy was given to the patient. The AVS/Transition Record was faxed to the next level of care today.
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
## 2022-06-16 NOTE — PROGRESS NOTES
Group Note    Number of Participants in Group: 11  Number of Patients on Unit:12      Patient attended group:Yes  Reason for Absence:  Intervention for patient absence:        Type of Group:   Wrap-Up/Relaxation    Patient's Goal: See wrap up group sheet    Participation Level:     Active Listener and Interactive           Patient Response to Learning: Yes    Patient's Behavior: Pleasant    Is Patient Social/Interacting: Yes    Relaxation:   Television:No   Reading:Yes   Game/Puzzle:No   Phone: Yes       Notes/Comments:      Please see patient's wrap up group sheet for patient's comments       Electronically signed by Soledad Mcgovern on 6/16/22 at 3:30 AM CDT

## 2022-06-16 NOTE — PROGRESS NOTES
Treatment Team Note:     Therapist met with treatment team to discuss patients treatment and discharge plans.      Progress/Behavior/Group Attendance: TBD     Target Symptoms/Reason for admission: Patient admitted to [facility] due to:female who presents to the emergency department due to depression and suicidal ideation. Dwain Pina says she is out with depression for some time.  Recently started following with [address] and is supposed to be on Celexa but has not been compliant with medication for the past month or so.  No HI.  No hallucinations or delusions.  Does have SI without a specific plan.  Denies any other medical problems. David Jeter being seen by her therapist today she was sent here as her therapist felt like she probably needs to be admitted. Dwain Pina is agreeable to admission.   Diagnoses: Moderate episode of recurrent major depressive disorder   Anxiety Unspecified  Chronic PTSD   UDS: Cannabinoid   BAL:  Neg      Aftercare Plan: [address]     Pt lives with: family     Collateral obtained from: mom  On:6/15/22     Family Session: TBA     Depression: 0   Anxiety: 6  SI denies suicidal ideation   HI Negative for homicidal ideation      AVH:Absent     Taking medication:  Rate the quality of sleep:Sleep Quality Good  Sleep Medications: Yes   PRN Sleep Meds: Yes      Misc:

## 2022-06-16 NOTE — PLAN OF CARE
Problem: Self Harm/Suicidality  Goal: Will have no self-injury during hospital stay  Description: INTERVENTIONS:  1. Q 30 MINUTES: Routine safety checks  2. Q SHIFT & PRN: Assess risk to determine if routine checks are adequate to maintain patient safety  6/16/2022 1554 by Miguel Delaney RN  Outcome: Completed  6/16/2022 1114 by Desiree Tello LPC  Outcome: Progressing     Problem: Safety - Adult  Goal: Free from fall injury  Outcome: Completed

## 2022-06-16 NOTE — PROGRESS NOTES
CLINICAL PHARMACY NOTE: MEDS TO BEDS    Total # of Prescriptions Filled: 3   The following medications were delivered to the patient:  · Vitamin D  · Hydroxyzine 25mg  · Fluoxetine 20mg    Additional Documentation:   The medications were delivered and handed to [facility] on 6th floor adult side

## 2022-06-16 NOTE — PROGRESS NOTES
Progress Note  [facility]  6/16/2022 7:20 AM  Subjective:   Admit Date:   6/13/2022      CC/ADMIT DX:       Interval History:   Reviewed overnight events and nursing notes. She has no new medical issues. I have reviewed all labs/diagnostics from the last 24hrs. ROS:   I have done a 10 point ROS and all are negative, except what is mentioned in the HPI. ADULT DIET; Regular    Medications:      vitamin D  50,000 Units Oral Weekly    melatonin  5 mg Oral Nightly    FLUoxetine  20 mg Oral Daily           Objective:   Vitals: /63   Pulse (!) 103   Temp 99 °F (37.2 °C) (Temporal)   Resp 16   Ht 5' 4\" (1.626 m)   Wt 127 lb (57.6 kg)   SpO2 99%   BMI 21.80 kg/m²  No intake or output data in the 24 hours ending 06/16/22 0720  General appearance: alert and cooperative with exam  Extremities: extremities normal, atraumatic, no cyanosis or edema  Neurologic:  No obvious focal neurologic deficits. Skin: no rashes    Assessment and Plan:   Principal Problem: Moderate episode of recurrent major depressive disorder (HCC)  Active Problems:    Anxiety disorder, unspecified  Resolved Problems:    * No resolved hospital problems. *    Vit D Def    Plan:  1. Continue present medication(s)   2. Replace Vit D  3. Follow with Psych      Discharge planning:   her home     Reviewed treatment plans with the patient and/or family.              Electronically signed by Laurie Meier MD on 6/16/2022 at 7:20 AM

## 2022-06-16 NOTE — PROGRESS NOTES
[facility] Adult Unit Daily Assessment  Nursing Progress Note    Room: [facility]/606-01   Name: Nehal Dickinson   Age: 25 y.o. Gender: female   Dx: Moderate episode of recurrent major depressive disorder (HCC)  Precautions: close watch and suicide risk  Inpatient Status: voluntary       SLEEP:    Sleep Quality Good  Sleep Medications: Yes   PRN Sleep Meds: No       MEDICAL:    Other PRN Meds: Yes   Med Compliant: Yes  Accu-Chek: No  Oxygen/CPAP/BiPAP: No  CIWA/CINA: No   PAIN Assessment: none  Side Effects from medication: No    COVID Teaching:    Is Patient experiencing any respiratory symptoms (headache, fever, body aches, cough. Luis E Hilt ): no  Patient educated by nursing to practice social distancing, wear masks, wash hands frequently: yes    Medical Bed:   Is patient in a medical bed? no   If medical bed is in use, has nursing secured room while patient is awake and out of the room? NA  Has safety checks by nursing been completed on the bed/room this shift? NA    Protective Factors:    Patient identifies protective factors with nursing staff as follows: Identifies reasons for living: Yes and No   Supportive Social Network or family: Yes    Belief that suicide is immoral/high spirituality: Yes   Responsibility to family or others/living with family: Yes   Fear of death or dying due to pain and suffering: Yes   Engaged in work or school: No     If Patient is unable to identify, reason why? N/A      PSYCH:    Depression: 0   Anxiety: 5   SI denies suicidal ideation   HI Negative for homicidal ideation      AVH:Absent      GENERAL:    Appetite: no change from normal   Percent Meals: N/A   Social: No - with staff  Speech: normal   Appearance: appropriately dressed and healthy looking    GROUP:    Group Participation: Yes  Participation Quality: Active Listener and Interactive    Notes: PT isolated to room most of the evening. PT did sit in TV area with others and read a book and that is where this encounter took place.  PT is bright, friendly, and engages well with nurse with good eye contact. 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          Electronically signed by Rivka Kent RN on 6/16/22 at 5:33 AM CDT

## 2022-06-16 NOTE — DISCHARGE INSTR - DIET

## 2022-06-16 NOTE — PROGRESS NOTES
Discharge Note     Pt discharging on this date. Pt denies SI, HI, and AVH at this time. Pt reports improvement in behavior and is leaving unit in overall good condition. SW and pt discussed pt's follow up appointments and importance of attending appointments as scheduled, pt voiced understanding and agreement. Pt and SW also discussed pt safety plan and pt able to verbally identify: warning signs, coping strategies, places and people that help make the pt feel better/distract negative thoughts, friends/family/agencies/professionals the pt can reach out to in a crisis, and something that is important to the pt/worth living for. Pt provided the national suicide prevention hotline number (7-257-946-575-990-8194) as well as local community behavioral health [facility]) crisis number for emergencies (5-824-754-903-666-1883). Pt to follow up with:  [id number] [place] [address]) on __/__/22 @ 00:00 AM/PM. Patient will follow up with --at [facility] for medication management, patient will be seen on __/__/22 @ 00:00 AM/PM for the med management appt.      Referral to out patient tobacco cessation counseling treatment:  Made at discharge with (company) on (date) at (time)  Patient refused referral to outpatient tobacco cessation counseling    SW offered to assist pt with transportation, pt reports

## 2022-06-17 NOTE — PROGRESS NOTES
CSW acknowledges that the patients psychosocial and lifetime CSSR-S was completed. CSW reviewed lifetime C-SSRS and psychosocial. CSW has participated in treatment team and discharge planning for patient.        Electronically signed by CONCEPCION Barraza on 6/17/2022 at 3:47 PM

## 2022-06-17 NOTE — PROGRESS NOTES
Progress Note  Bernice Jack  6/17/2022 12:09 AM  Subjective:   Admit Date:   6/13/2022      CC/ADMIT DX:       Interval History:   Reviewed overnight events and nursing notes. She has no physical complaints. I have reviewed all labs/diagnostics from the last 24hrs. ROS:   I have done a 10 point ROS and all are negative, except what is mentioned in the HPI. No diet orders on file    Medications:             Objective:   Vitals: /60   Pulse 90   Temp 97.7 °F (36.5 °C) (Temporal)   Resp 16   Ht 5' 4\" (1.626 m)   Wt 127 lb (57.6 kg)   SpO2 98%   BMI 21.80 kg/m²  No intake or output data in the 24 hours ending 06/17/22 0009  General appearance: alert and cooperative with exam  Extremities: extremities normal, atraumatic, no cyanosis or edema  Neurologic:  No obvious focal neurologic deficits. Skin: no rashes    Assessment and Plan:   Principal Problem: Moderate episode of recurrent major depressive disorder (HCC)  Active Problems:    Anxiety disorder, unspecified  Resolved Problems:    * No resolved hospital problems. *    Vit D Def    Plan:  1. Continue present medication(s)   2. She is medically stable. I will monitor for any changes or concerns. 3.  Follow with Psych      Discharge planning:   her home     Reviewed treatment plans with the patient and/or family.              Electronically signed by Batsheva Izquierdo MD on 6/17/2022 at 12:09 AM

## 2022-10-26 ENCOUNTER — HOSPITAL ENCOUNTER (OUTPATIENT)
Dept: GENERAL RADIOLOGY | Facility: HOSPITAL | Age: 22
Discharge: HOME | End: 2022-10-26

## 2022-10-26 PROCEDURE — 73610 X-RAY EXAM OF ANKLE: CPT
